# Patient Record
Sex: MALE | Race: OTHER | Employment: OTHER | URBAN - METROPOLITAN AREA
[De-identification: names, ages, dates, MRNs, and addresses within clinical notes are randomized per-mention and may not be internally consistent; named-entity substitution may affect disease eponyms.]

---

## 2017-11-17 ENCOUNTER — TELEPHONE (OUTPATIENT)
Dept: INTERNAL MEDICINE CLINIC | Age: 27
End: 2017-11-17

## 2017-11-17 ENCOUNTER — OFFICE VISIT (OUTPATIENT)
Dept: INTERNAL MEDICINE CLINIC | Age: 27
End: 2017-11-17

## 2017-11-17 VITALS
OXYGEN SATURATION: 96 % | RESPIRATION RATE: 17 BRPM | TEMPERATURE: 98.5 F | BODY MASS INDEX: 24.83 KG/M2 | WEIGHT: 183.3 LBS | HEART RATE: 80 BPM | HEIGHT: 72 IN | SYSTOLIC BLOOD PRESSURE: 133 MMHG | DIASTOLIC BLOOD PRESSURE: 81 MMHG

## 2017-11-17 DIAGNOSIS — E73.9 LACTOSE INTOLERANCE IN ADULT: Primary | ICD-10-CM

## 2017-11-17 DIAGNOSIS — L72.9 SCALP CYST: ICD-10-CM

## 2017-11-17 NOTE — PROGRESS NOTES
Chief Complaint   Patient presents with    GI Problem     he is a 32y.o. year old male who presents for evaluation of lactose intolerance, patient is done in the elimination diet by himself removing cheeses and lactose and states that this is resolved his symptoms. His symptoms consist of gas and diarrhea when he eats pizza or has little lactose foods. Patient does have a family history of Crohn's and mother. Patient denies any symptoms today and states there is no blood in stools. Since being off the lactose his symptoms are gotten much better. Patient also complains of a lump on his scalp that he has noticed since cutting his hair short. This is been there for a long time but he needs to have longer hair and has never really bother him until he cut his hair short. No pain or growth of the cyst.    Reviewed and agree with Nurse Note and duplicated in this note. Reviewed PmHx, RxHx, FmHx, SocHx, AllgHx and updated and dated in the chart. History reviewed. No pertinent family history. History reviewed. No pertinent past medical history.    Social History     Social History    Marital status: SINGLE     Spouse name: N/A    Number of children: N/A    Years of education: N/A     Social History Main Topics    Smoking status: Never Smoker    Smokeless tobacco: Never Used    Alcohol use 2.4 oz/week     4 Cans of beer per week    Drug use: No    Sexual activity: Not Asked     Other Topics Concern    None     Social History Narrative    None        Review of Systems - negative except as listed above      Objective:     Vitals:    11/17/17 0951   Weight: 183 lb 4.8 oz (83.1 kg)   Height: 6' (1.829 m)       Physical Examination: General appearance - alert, well appearing, and in no distress  Chest - clear to auscultation, no wheezes, rales or rhonchi, symmetric air entry  Heart - normal rate, regular rhythm, normal S1, S2, no murmurs, rubs, clicks or gallops  Abdomen - soft, nontender, nondistended, no masses or organomegaly  Back exam - full range of motion, no tenderness, palpable spasm or pain on motion  Neurological - alert, oriented, normal speech, no focal findings or movement disorder noted  Musculoskeletal - no joint tenderness, deformity or swelling  Extremities - peripheral pulses normal, no pedal edema, no clubbing or cyanosis  Skin - LESIONS NOTED: Cyst on the anterior scalp 2 cm diameter    Assessment/ Plan:   Diagnoses and all orders for this visit:    1. Lactose intolerance in adult  -     LACTOSE TOLERANCE TEST    2. Scalp cyst  -     REFERRAL TO SURGERY     Follow-up Disposition: Not on File      1) Remember to stay active and/or exercise regularly (I suggest 30-45 minutes daily)   2) For reliable dietary information, go to www. EATRIGHT.org. You may wish to consider seeing the nutritionist at [facility] [phone], also consider the [id number] [facility]. 3) I routinely suggest a complete physical exam once each year (your birth month)  I have discussed the diagnosis with the patient and the intended plan as seen in the above orders. The patient has received an after-visit summary and questions were answered concerning future plans. Medication Side Effects and Warnings were discussed with patient: yes  Patient Labs were reviewed and or requested: yes  Patient Past Records were reviewed and or requested  yes  I have discussed the diagnosis with the patient and the intended plan as seen in the above orders. Pt agrees to call or return to clinic and/or go to closest ER with any worsening of symptoms. This may include, but not limited to increased fever (>100.4) with NSAIDS or Tylenol, increased edema, confusion, rash, worsening of presenting symptoms.

## 2017-11-17 NOTE — PATIENT INSTRUCTIONS
Lactose Intolerance: Care Instructions  Your Care Instructions    Lactose is sugar that is found in milk and milk products. Some people do not make enough of an enzyme called lactase, which digests lactose. When this happens it can cause gas, belly pain, diarrhea, and bloating. This is called lactose intolerance. This is not the same as food allergy to milk. Lactose intolerance affects different people in different ways. Some people cannot digest any milk products. Other people can eat or drink small amounts of milk products or certain types of milk products without problems. You can learn how to avoid discomfort and still get enough calcium to maintain healthy bones. Follow-up care is a key part of your treatment and safety. Be sure to make and go to all appointments, and call your doctor if you are having problems. It's also a good idea to know your test results and keep a list of the medicines you take. How can you care for yourself at home? · Limit the amount of milk and milk products in your diet. Try to drink 1 glass of milk each day. Drink small amounts several times a day. All types of milk contain the same amount of lactose. If you are not sure whether a milk product causes symptoms, try a small amount and wait to see how you feel before you eat or drink more. · Eat or drink milk and milk products along with other foods. For some people, combining a solid food (like cereal) with a dairy product (like milk) can reduce symptoms. · Eat small amounts of milk products throughout the day instead of larger amounts all at once. · Eat or drink milk and milk products that have reduced lactose. In most grocery stores, you can buy milk with reduced lactose, such as Lactaid milk. · Eat or drink other foods instead of milk and milk products. Try soy milk and soy cheese, and use nondairy creamers in your coffee. Keep in mind that nondairy creamers may contain more fat than milk. · Use lactase products.  These are dietary supplements that help you digest lactose. Some are pills that you chew (such as Lactaid) before you eat or drink milk products. Others are liquids that you add to milk 24 hours before you drink it. Try a few products and brands to see which ones work best for you. · Some people who are lactose-intolerant can eat some kinds of yogurt without problems, especially yogurt with live cultures. It's best to try a small amount of different brands of yogurt to see which ones work best for you. · Watch out for lactose in foods you buy. Some prepared foods contain lactose, including breads and baked goods, breakfast cereals, instant potatoes and soups, margarine, salad dressings, and many snacks. Be sure to read labels for lactose and for lactose's \"hidden\" names. These include dry milk solids, whey, curds, milk by-products, and nonfat dry milk powder. · Be sure to get enough calcium in your diet, especially if you avoid milk products completely. To get enough calcium, you would need to eat calcium-rich foods as often as someone would drink milk. Calcium is very important because it keeps bones strong and reduces the risk of osteoporosis. Ask your dietitian for advice on how to get enough calcium. Foods that have calcium include:  ¨ Broccoli, spinach, kale, and ofelia, mustard, and turnip greens. ¨ Canned sardines and other small fish that have bones you can eat. ¨ Calcium-fortified orange juice. ¨ Soy products such as fortified soy milk and tofu. ¨ Almonds. ¨ Dried beans. · If you are worried about getting enough nutrients, ask your doctor about taking supplements, such as calcium and vitamin D. When should you call for help? Call your doctor now or seek immediate medical care if:  ? · You have new or worse belly pain. ? Watch closely for changes in your health, and be sure to contact your doctor if:  ? · You do not get better as expected. Where can you learn more?   Go to http://turner-donita.info/. Enter H201 in the search box to learn more about \"Lactose Intolerance: Care Instructions. \"  Current as of: September 29, 2016  Content Version: 11.4  © 2994-0602 [employer]. Care instructions adapted under license by [employer] (which disclaims liability or warranty for this information). If you have questions about a medical condition or this instruction, always ask your healthcare professional. [address] any warranty or liability for your use of this information. Lactose-Restricted Diet: Care Instructions  Your Care Instructions    Lactose is a sugar that is in milk and milk products. Some people do not make enough of an enzyme called lactase, which digests lactose. When this happens it can cause gas, belly pain, diarrhea, and bloating. This is called lactose intolerance. This is not the same as food allergy to milk. With planning, you can avoid lactose and still eat a tasty and nutritious diet and get enough calcium to maintain healthy bones. Your doctor and dietitian will help you design a diet based on your level of lactose intolerance and what you like to eat. Always talk with your doctor or dietitian before you make changes in your diet. Follow-up care is a key part of your treatment and safety. Be sure to make and go to all appointments, and call your doctor if you are having problems. It's also a good idea to know your test results and keep a list of the medicines you take. How can you care for yourself at home? · Limit the amount of milk and milk products in your diet. Spread small amounts of milk or milk products throughout the day, instead of larger amounts all at once. ¨ If you have bad symptoms when you eat or drink something with lactose, you may need to avoid it completely. ¨ You may be able to drink 1 glass of milk each day, although you may not be able to drink more than a ½ cup at a time. All types of milk contain the same amount of lactose. ¨ If you are not sure whether a milk product causes symptoms, try a small amount and wait to see how you feel before you eat or drink more. · Try yogurt and cheese. These have less lactose than milk and may not cause problems. · Eat or drink milk and milk products that have reduced lactose. In most grocery stores, you can buy milk with reduced lactose, such as Lactaid milk. · Use lactase products. These are dietary supplements that help you digest lactose. Some are pills that you chew (such as Lactaid) before you eat or drink milk products. Others are liquids that you add to milk 24 hours before you drink it. Try a few products and brands to see which ones work best for you. · Eat or drink other foods, such as soy milk and soy cheese, instead of milk and milk products. · If you are very sensitive to lactose, read labels carefully to spot the lactose products. ¨ Some medicines have lactose. ¨ Prepared foods that may have lactose include breads, baked goods, breakfast cereals, instant breakfast drinks, instant potatoes, instant soups, baking mixes (such as pancake, cookie, and biscuit mixes), margarine, salad dressings, candies, milk chocolate, and other snacks. ¨ Lactose may also be called whey, curds, or milk products. · Be sure to get enough calcium in your diet, especially if you avoid milk products completely. To get enough calcium, you would need to eat calcium-rich foods as often as someone would drink milk. Calcium is very important because it keeps bones strong and reduces the risk of osteoporosis. Ask your dietitian for advice on how to get enough calcium. Foods that have calcium include:  ¨ Broccoli, spinach, kale, and ofelia, mustard, and turnip greens. ¨ Canned sardines and other small fish that have bones you can eat. ¨ Calcium-fortified orange juice. ¨ Soy products such as fortified soy milk and tofu. ¨ Almonds. ¨ Dried beans.   · If you are worried about getting enough nutrients, ask your doctor about taking supplements, such as calcium and vitamin D. When should you call for help? Call your doctor now or seek immediate medical care if:  ? · You have new or worse belly pain. ? Watch closely for changes in your health, and be sure to contact your doctor if:  ? · You do not get better as expected. Where can you learn more? Go to http://turner-donita.info/. Enter W147 in the search box to learn more about \"Lactose-Restricted Diet: Care Instructions. \"  Current as of: September 29, 2016  Content Version: 11.4  © 0686-5314 [employer]. Care instructions adapted under license by [employer] (which disclaims liability or warranty for this information). If you have questions about a medical condition or this instruction, always ask your healthcare professional. [address] any warranty or liability for your use of this information.

## 2017-11-17 NOTE — MR AVS SNAPSHOT
Visit Information Date & Time Provider Department Dept. Phone Encounter #  
 11/17/2017  9:30 AM [facility] [place] [address] [id number] Follow-up Instructions Return in about 2 weeks (around 12/1/2017) for The sebaceous cyst and complete physical.  
 Follow-up and Disposition History Your Appointments 11/28/2017  1:20 PM  
New Patient with MD Sixto Luis 137  ([address]) Appt Note: np/cyst on scalp/Adekemi willfax referral today-PCP's office [address] [facility] [id number]  
[address] [place] [address] Upcoming Health Maintenance Date Due DTaP/Tdap/Td series (1 - Tdap) 7/14/2011 Allergies as of 11/17/2017  Review Complete On: 11/17/2017 By: Jewels Robertson LPN No Known Allergies Current Immunizations  Never Reviewed No immunizations on file. Not reviewed this visit You Were Diagnosed With   
  
 Codes Comments Lactose intolerance in adult    -  Primary ICD-10-CM: E73.9 ICD-9-CM: 271.3 Scalp cyst     ICD-10-CM: L72.9 ICD-9-CM: 706. 2 Vitals BP Pulse Temp Resp Height(growth percentile) Weight(growth percentile) 133/81 (BP 1 Location: Left arm, BP Patient Position: Sitting) 80 98.5 °F (36.9 °C) (Oral) 17 6' (1.829 m) 183 lb 4.8 oz (83.1 kg) SpO2 BMI Smoking Status 96% 24.86 kg/m2 Never Smoker Vitals History BMI and BSA Data Body Mass Index Body Surface Area  
 24.86 kg/m 2 2.05 m 2 Preferred Pharmacy Pharmacy Name Phone [id number] Bryestrella Romero[address]op[address], [address] [facility] Johnathan Masterson 9 [phone] Your Updated Medication List  
  
Notice  As of 11/17/2017 10:58 AM  
 You have not been prescribed any medications. We Performed the Following LACTOSE TOLERANCE TEST [CAC1671 Custom] REFERRAL TO SURGERY [QUW370 Custom] Follow-up Instructions Return in about 2 weeks (around 12/1/2017) for The sebaceous cyst and complete physical.  
  
  
Referral Information Referral ID Referred By Referred To  
  
 [id number] [place], [address] Jenifer De La Garza MD   
   [id number]  [address] Ayana Bernal Phone: [phone] Fax: [phone] Visits Status Start Date End Date 1 Authorized/Scheduled 11/17/17 11/17/18 If your referral has a status of pending review or denied, additional information will be sent to support the outcome of this decision. Patient Instructions Lactose Intolerance: Care Instructions Your Care Instructions Lactose is sugar that is found in milk and milk products. Some people do not make enough of an enzyme called lactase, which digests lactose. When this happens it can cause gas, belly pain, diarrhea, and bloating. This is called lactose intolerance. This is not the same as food allergy to milk. Lactose intolerance affects different people in different ways. Some people cannot digest any milk products. Other people can eat or drink small amounts of milk products or certain types of milk products without problems. You can learn how to avoid discomfort and still get enough calcium to maintain healthy bones. Follow-up care is a key part of your treatment and safety. Be sure to make and go to all appointments, and call your doctor if you are having problems. It's also a good idea to know your test results and keep a list of the medicines you take. How can you care for yourself at home? · Limit the amount of milk and milk products in your diet. Try to drink 1 glass of milk each day. Drink small amounts several times a day. All types of milk contain the same amount of lactose. If you are not sure whether a milk product causes symptoms, try a small amount and wait to see how you feel before you eat or drink more. · Eat or drink milk and milk products along with other foods. For some people, combining a solid food (like cereal) with a dairy product (like milk) can reduce symptoms. · Eat small amounts of milk products throughout the day instead of larger amounts all at once. · Eat or drink milk and milk products that have reduced lactose. In most grocery stores, you can buy milk with reduced lactose, such as Lactaid milk. · Eat or drink other foods instead of milk and milk products. Try soy milk and soy cheese, and use nondairy creamers in your coffee. Keep in mind that nondairy creamers may contain more fat than milk. · Use lactase products. These are dietary supplements that help you digest lactose. Some are pills that you chew (such as Lactaid) before you eat or drink milk products. Others are liquids that you add to milk 24 hours before you drink it. Try a few products and brands to see which ones work best for you. · Some people who are lactose-intolerant can eat some kinds of yogurt without problems, especially yogurt with live cultures. It's best to try a small amount of different brands of yogurt to see which ones work best for you. · Watch out for lactose in foods you buy. Some prepared foods contain lactose, including breads and baked goods, breakfast cereals, instant potatoes and soups, margarine, salad dressings, and many snacks. Be sure to read labels for lactose and for lactose's \"hidden\" names. These include dry milk solids, whey, curds, milk by-products, and nonfat dry milk powder. · Be sure to get enough calcium in your diet, especially if you avoid milk products completely. To get enough calcium, you would need to eat calcium-rich foods as often as someone would drink milk. Calcium is very important because it keeps bones strong and reduces the risk of osteoporosis. Ask your dietitian for advice on how to get enough calcium. Foods that have calcium include: ¨ Broccoli, spinach, kale, and ofelia, mustard, and turnip greens. ¨ Canned sardines and other small fish that have bones you can eat. ¨ Calcium-fortified orange juice. ¨ Soy products such as fortified soy milk and tofu. ¨ Almonds. ¨ Dried beans. · If you are worried about getting enough nutrients, ask your doctor about taking supplements, such as calcium and vitamin D. When should you call for help? Call your doctor now or seek immediate medical care if: 
? · You have new or worse belly pain. ? Watch closely for changes in your health, and be sure to contact your doctor if: 
? · You do not get better as expected. Where can you learn more? Go to http://turner-donita.info/. Enter H201 in the search box to learn more about \"Lactose Intolerance: Care Instructions. \" Current as of: September 29, 2016 Content Version: 11.4 © 1537-7060 [employer]. Care instructions adapted under license by [employer] (which disclaims liability or warranty for this information). If you have questions about a medical condition or this instruction, always ask your healthcare professional. [place] [address] any warranty or liability for your use of this information. Lactose-Restricted Diet: Care Instructions Your Care Instructions Lactose is a sugar that is in milk and milk products. Some people do not make enough of an enzyme called lactase, which digests lactose. When this happens it can cause gas, belly pain, diarrhea, and bloating. This is called lactose intolerance. This is not the same as food allergy to milk. With planning, you can avoid lactose and still eat a tasty and nutritious diet and get enough calcium to maintain healthy bones. Your doctor and dietitian will help you design a diet based on your level of lactose intolerance and what you like to eat. Always talk with your doctor or dietitian before you make changes in your diet. Follow-up care is a key part of your treatment and safety. Be sure to make and go to all appointments, and call your doctor if you are having problems. It's also a good idea to know your test results and keep a list of the medicines you take. How can you care for yourself at home? · Limit the amount of milk and milk products in your diet. Spread small amounts of milk or milk products throughout the day, instead of larger amounts all at once. ¨ If you have bad symptoms when you eat or drink something with lactose, you may need to avoid it completely. ¨ You may be able to drink 1 glass of milk each day, although you may not be able to drink more than a ½ cup at a time. All types of milk contain the same amount of lactose. ¨ If you are not sure whether a milk product causes symptoms, try a small amount and wait to see how you feel before you eat or drink more. · Try yogurt and cheese. These have less lactose than milk and may not cause problems. · Eat or drink milk and milk products that have reduced lactose. In most grocery stores, you can buy milk with reduced lactose, such as Lactaid milk. · Use lactase products. These are dietary supplements that help you digest lactose. Some are pills that you chew (such as Lactaid) before you eat or drink milk products. Others are liquids that you add to milk 24 hours before you drink it. Try a few products and brands to see which ones work best for you. · Eat or drink other foods, such as soy milk and soy cheese, instead of milk and milk products. · If you are very sensitive to lactose, read labels carefully to spot the lactose products. ¨ Some medicines have lactose. ¨ Prepared foods that may have lactose include breads, baked goods, breakfast cereals, instant breakfast drinks, instant potatoes, instant soups, baking mixes (such as pancake, cookie, and biscuit mixes), margarine, salad dressings, candies, milk chocolate, and other snacks. ¨ Lactose may also be called whey, curds, or milk products. · Be sure to get enough calcium in your diet, especially if you avoid milk products completely. To get enough calcium, you would need to eat calcium-rich foods as often as someone would drink milk. Calcium is very important because it keeps bones strong and reduces the risk of osteoporosis. Ask your dietitian for advice on how to get enough calcium. Foods that have calcium include: ¨ Broccoli, spinach, kale, and ofelia, mustard, and turnip greens. ¨ Canned sardines and other small fish that have bones you can eat. ¨ Calcium-fortified orange juice. ¨ Soy products such as fortified soy milk and tofu. ¨ Almonds. ¨ Dried beans. · If you are worried about getting enough nutrients, ask your doctor about taking supplements, such as calcium and vitamin D. When should you call for help? Call your doctor now or seek immediate medical care if: 
? · You have new or worse belly pain. ? Watch closely for changes in your health, and be sure to contact your doctor if: 
? · You do not get better as expected. Where can you learn more? Go to http://turner-donita.info/. Enter I234 in the search box to learn more about \"Lactose-Restricted Diet: Care Instructions. \" Current as of: September 29, 2016 Content Version: 11.4 © 8481-7496 PneumRx. Care instructions adapted under license by readfy (which disclaims liability or warranty for this information). If you have questions about a medical condition or this instruction, always ask your healthcare professional. [place] [address] any warranty or liability for your use of this information. Introducing [facility]! Tania Bro introduces [employer] patient portal. Now you can access parts of your medical record, email your doctor's office, and request medication refills online.    
 
1. In your internet browser, go to [email or web address]. [email or web address] 2. Click on the First Time User? Click Here link in the Sign In box. You will see the New Member Sign Up page. 3. Enter your Funny Or Die Access Code exactly as it appears below. You will not need to use this code after youve completed the sign-up process. If you do not sign up before the expiration date, you must request a new code. · Funny Or Die Access Code: [email or web address] Expires: 2/15/2018 10:58 AM 
 
4. Enter the last four digits of your Social Security Number (xxxx) and Date of Birth (mm/dd/yyyy) as indicated and click Submit. You will be taken to the next sign-up page. 5. Create a PlexPresst ID. This will be your Funny Or Die login ID and cannot be changed, so think of one that is secure and easy to remember. 6. Create a Funny Or Die password. You can change your password at any time. 7. Enter your Password Reset Question and Answer. This can be used at a later time if you forget your password. 8. Enter your e-mail address. You will receive e-mail notification when new information is available in [address] 9. Click Sign Up. You can now view and download portions of your medical record. 10. Click the Download Summary menu link to download a portable copy of your medical information. If you have questions, please visit the Frequently Asked Questions section of the Funny Or Die website. Remember, Funny Or Die is NOT to be used for urgent needs. For medical emergencies, dial 911. Now available from your iPhone and Android! Please provide this summary of care documentation to your next provider. If you have any questions after today's visit, please call [phone].

## 2018-08-06 ENCOUNTER — HOSPITAL ENCOUNTER (OUTPATIENT)
Dept: GENERAL RADIOLOGY | Age: 28
Discharge: HOME | End: 2018-08-06
Attending: FAMILY MEDICINE

## 2018-08-06 DIAGNOSIS — R06.2 WHEEZING: ICD-10-CM

## 2021-04-21 ENCOUNTER — OFFICE VISIT (OUTPATIENT)
Dept: PRIMARY CARE CLINIC | Age: 31
End: 2021-04-21
Payer: COMMERCIAL

## 2021-04-21 VITALS
WEIGHT: 186.2 LBS | OXYGEN SATURATION: 100 % | DIASTOLIC BLOOD PRESSURE: 72 MMHG | HEIGHT: 71 IN | RESPIRATION RATE: 16 BRPM | BODY MASS INDEX: 26.07 KG/M2 | SYSTOLIC BLOOD PRESSURE: 109 MMHG | TEMPERATURE: 97.8 F | HEART RATE: 76 BPM

## 2021-04-21 DIAGNOSIS — E78.00 HYPERCHOLESTEREMIA: Primary | ICD-10-CM

## 2021-04-21 DIAGNOSIS — J30.1 SEASONAL ALLERGIC RHINITIS DUE TO POLLEN: ICD-10-CM

## 2021-04-21 DIAGNOSIS — E55.9 VITAMIN D DEFICIENCY: ICD-10-CM

## 2021-04-21 DIAGNOSIS — E80.4 GILBERT SYNDROME: ICD-10-CM

## 2021-04-21 DIAGNOSIS — R53.83 FATIGUE, UNSPECIFIED TYPE: ICD-10-CM

## 2021-04-21 DIAGNOSIS — J45.30 MILD PERSISTENT ASTHMA WITHOUT COMPLICATION: ICD-10-CM

## 2021-04-21 DIAGNOSIS — F95.2 TOURETTE DISORDER: ICD-10-CM

## 2021-04-21 DIAGNOSIS — K21.9 GASTROESOPHAGEAL REFLUX DISEASE WITHOUT ESOPHAGITIS: ICD-10-CM

## 2021-04-21 PROCEDURE — 99204 OFFICE O/P NEW MOD 45 MIN: CPT | Performed by: INTERNAL MEDICINE

## 2021-04-21 RX ORDER — FLUTICASONE PROPIONATE AND SALMETEROL 250; 50 UG/1; UG/1
1 POWDER RESPIRATORY (INHALATION) EVERY 12 HOURS
Qty: 3 INHALER | Refills: 3 | Status: SHIPPED | OUTPATIENT
Start: 2021-04-21 | End: 2022-02-16

## 2021-04-21 RX ORDER — FLUTICASONE PROPIONATE AND SALMETEROL 250; 50 UG/1; UG/1
1 POWDER RESPIRATORY (INHALATION) EVERY 12 HOURS
COMMUNITY
End: 2021-04-21 | Stop reason: SDUPTHER

## 2021-04-21 RX ORDER — OMEPRAZOLE 20 MG/1
20 CAPSULE, DELAYED RELEASE ORAL DAILY
COMMUNITY
End: 2021-04-21 | Stop reason: SDUPTHER

## 2021-04-21 RX ORDER — LORATADINE 10 MG/1
10 TABLET ORAL
COMMUNITY
End: 2021-04-21 | Stop reason: SDUPTHER

## 2021-04-21 RX ORDER — ATORVASTATIN CALCIUM 10 MG/1
10 TABLET, FILM COATED ORAL DAILY
Qty: 90 TAB | Refills: 2 | Status: SHIPPED | OUTPATIENT
Start: 2021-04-21 | End: 2022-01-17

## 2021-04-21 RX ORDER — OMEPRAZOLE 20 MG/1
20 CAPSULE, DELAYED RELEASE ORAL DAILY
Qty: 90 CAP | Refills: 1 | Status: SHIPPED | OUTPATIENT
Start: 2021-04-21 | End: 2021-10-19

## 2021-04-21 RX ORDER — MONTELUKAST SODIUM 10 MG/1
10 TABLET ORAL DAILY
Qty: 90 TAB | Refills: 1 | Status: SHIPPED | OUTPATIENT
Start: 2021-04-21 | End: 2021-10-19

## 2021-04-21 RX ORDER — MONTELUKAST SODIUM 10 MG/1
10 TABLET ORAL DAILY
COMMUNITY
End: 2021-04-21 | Stop reason: SDUPTHER

## 2021-04-21 RX ORDER — LORATADINE 10 MG/1
10 TABLET ORAL DAILY
Qty: 90 TAB | Refills: 1 | Status: SHIPPED | OUTPATIENT
Start: 2021-04-21 | End: 2021-10-19

## 2021-04-21 RX ORDER — ATORVASTATIN CALCIUM 10 MG/1
10 TABLET, FILM COATED ORAL DAILY
COMMUNITY
End: 2021-04-21 | Stop reason: SDUPTHER

## 2021-04-21 NOTE — PROGRESS NOTES
Angelique Clement is a 27 y.o.  male and presents with     Chief Complaint   Patient presents with    Establish Care     medication refills     Cholesterol Problem    Allergic Rhinitis    Asthma     Pt is here to establish care. Pt has h/o hyperchol , allergic rhinitis ,asthma   He needs refills on meds. Pt has h/o [place] and Tourette's syndrome    He als ohas GERD symptoms        Past Medical History:   Diagnosis Date    Asthma     Chronic obstructive pulmonary disease ([facility] [address]     Gilbert syndrome     Hypercholesterolemia      History reviewed. No pertinent surgical history. Current Outpatient Medications   Medication Sig    atorvastatin (Lipitor) 10 mg tablet Take 1 Tab by mouth daily.  fluticasone propion-salmeteroL (Advair Diskus) 250-50 mcg/dose diskus inhaler Take 1 Puff by inhalation every twelve (12) hours.  loratadine (Claritin) 10 mg tablet Take 1 Tab by mouth daily.  montelukast (Singulair) 10 mg tablet Take 1 Tab by mouth daily.  omeprazole (PRILOSEC) 20 mg capsule Take 1 Cap by mouth daily. No current facility-administered medications for this visit. Health Maintenance   Topic Date Due    Hepatitis C Screening  Never done    Lipid Screen  Never done    DTaP/Tdap/Td series (1 - Tdap) Never done    Flu Vaccine (Season Ended) 09/01/2021    COVID-19 Vaccine  Completed    Pneumococcal 0-64 years  Aged [employer] History   Administered Date(s) Administered    COVID-19, PFIZER, MRNA, LNP-S, PF, 30MCG/0.3ML DOSE 03/12/2021, 04/06/2021     No LMP for male patient. Allergies and Intolerances:   No Known Allergies    Family History:   Family History   Problem Relation Age of Onset    No Known Problems Mother     Hypertension Father        Social History:   He  reports that he has never smoked. He has never used smokeless tobacco.  He  reports current alcohol use of about 4.0 standard drinks of alcohol per week.             Review of Systems:   General: negative for - chills, fatigue, fever, weight change  Psych: negative for - anxiety, depression, irritability or mood swings  ENT: negative for - headaches, hearing change, nasal congestion, oral lesions, sneezing or sore throat  Heme/ Lymph: negative for - bleeding problems, bruising, pallor or swollen lymph nodes  Endo: negative for - hot flashes, polydipsia/polyuria or temperature intolerance  Resp: negative for - cough, shortness of breath or wheezing  CV: negative for - chest pain, edema or palpitations  GI: negative for - abdominal pain, change in bowel habits, constipation, diarrhea or nausea/vomiting  : negative for - dysuria, hematuria, incontinence, pelvic pain or vulvar/vaginal symptoms  MSK: negative for - joint pain, joint swelling or muscle pain  Neuro: negative for - confusion, headaches, seizures or weakness  Derm: negative for - dry skin, hair changes, rash or skin lesion changes          Physical:   Vitals:   Vitals:    04/21/21 0902   BP: 109/72   Pulse: 76   Resp: 16   Temp: 97.8 °F (36.6 °C)   TempSrc: Temporal   SpO2: 100%   Weight: 186 lb 3.2 oz (84.5 kg)   Height: 5' 11\" (1.803 m)           Exam:   HEENT- atraumatic,normocephalic, awake, oriented, well nourished  Neck - supple,no enlarged lymph nodes, no JVD, no thyromegaly  Chest- CTA, no rhonchi, no crackles  Heart- rrr, no murmurs / gallop/rub  Abdomen- soft,BS+,NT, no hepatosplenomegaly  Ext - no c/c/edema   Neuro- no focal deficits. Power 5/5 all extremities  Skin - warm,dry, no obvious rashes. Review of Data:   LABS:   No results found for: WBC, HGB, HCT, PLT, HGBEXT, HCTEXT, PLTEXT  No results found for: NA, K, CL, CO2, GLU, BUN, CREA  No results found for: CHOL, CHOLX, CHLST, CHOLV, HDL, HDLP, LDL, LDLC, DLDLP, TGLX, TRIGL, TRIGP  No components found for: GPT        Impression / Plan:        ICD-10-CM ICD-9-CM    1.  Hypercholesteremia  E78.00 272.0 atorvastatin (Lipitor) 10 mg tablet      LIPID PANEL      METABOLIC PANEL, COMPREHENSIVE      LIPID PANEL      METABOLIC PANEL, COMPREHENSIVE   2. Seasonal allergic rhinitis due to pollen  J30.1 477.0 fluticasone propion-salmeteroL (Advair Diskus) 250-50 mcg/dose diskus inhaler      loratadine (Claritin) 10 mg tablet      montelukast (Singulair) 10 mg tablet   3. Gastroesophageal reflux disease without esophagitis  K21.9 530.81 omeprazole (PRILOSEC) 20 mg capsule   4. Vitamin D deficiency  E55.9 268.9 VITAMIN D, 25 HYDROXY      VITAMIN D, 25 HYDROXY   5. Fatigue, unspecified type  R53.83 780.79 TSH 3RD GENERATION      TSH 3RD GENERATION   6. Mild persistent asthma without complication  S77.51 831.04 fluticasone propion-salmeteroL (Advair Diskus) 250-50 mcg/dose diskus inhaler   7. Gilbert syndrome  E80.4 277.4    8. Tourette disorder  F95.2 Gaius.Balk      ]    Explained to patient risk benefits of the medications. Advised patient to stop meds if having any side effects. Pt verbalized understanding of the instructions. I have discussed the diagnosis with the patient and the intended plan as seen in the above orders. The patient has received an after-visit summary and questions were answered concerning future plans. I have discussed medication side effects and warnings with the patient as well. I have reviewed the plan of care with the patient, accepted their input and they are in agreement with the treatment goals. Reviewed plan of care. Patient has provided input and agrees with goals. Follow-up and Dispositions    · Return in about 6 months (around 10/21/2021).          Mary Funk MD

## 2021-04-21 NOTE — PROGRESS NOTES
Identified pt with two pt identifiers(name and ). Chief Complaint   Patient presents with   Lorrie Cabezas [facility]     medication refills    tdap - unknown      Vitals:    21 0902   BP: 109/72   Pulse: 76   Resp: 16   Temp: 97.8 °F (36.6 °C)   TempSrc: Temporal   SpO2: 100%   Weight: 186 lb 3.2 oz (84.5 kg)   Height: 5' 11\" (1.803 m)   PainSc:   0 - No pain       Health Maintenance Due   Topic    Hepatitis C Screening     Lipid Screen     DTaP/Tdap/Td series (1 - Tdap)       1. Have you been to the ER, urgent care clinic since your last visit? Hospitalized since your last visit? No    2. Have you seen or consulted any other health care providers outside of the [address] [place] [address] since your last visit? Include any pap smears or colon screening.  No

## 2021-10-18 DIAGNOSIS — J30.1 SEASONAL ALLERGIC RHINITIS DUE TO POLLEN: ICD-10-CM

## 2021-10-18 DIAGNOSIS — K21.9 GASTROESOPHAGEAL REFLUX DISEASE WITHOUT ESOPHAGITIS: ICD-10-CM

## 2021-10-19 RX ORDER — OMEPRAZOLE 20 MG/1
CAPSULE, DELAYED RELEASE ORAL
Qty: 30 CAPSULE | Refills: 0 | Status: SHIPPED | OUTPATIENT
Start: 2021-10-19 | End: 2021-11-18

## 2021-10-19 RX ORDER — MONTELUKAST SODIUM 10 MG/1
TABLET ORAL
Qty: 30 TABLET | Refills: 0 | Status: SHIPPED | OUTPATIENT
Start: 2021-10-19 | End: 2021-11-18

## 2021-10-19 RX ORDER — LORATADINE 10 MG/1
TABLET ORAL
Qty: 30 TABLET | Refills: 0 | Status: SHIPPED | OUTPATIENT
Start: 2021-10-19 | End: 2021-11-04

## 2021-11-04 ENCOUNTER — OFFICE VISIT (OUTPATIENT)
Dept: PRIMARY CARE CLINIC | Age: 31
End: 2021-11-04
Payer: COMMERCIAL

## 2021-11-04 VITALS
WEIGHT: 188 LBS | SYSTOLIC BLOOD PRESSURE: 108 MMHG | HEIGHT: 71 IN | RESPIRATION RATE: 18 BRPM | OXYGEN SATURATION: 97 % | DIASTOLIC BLOOD PRESSURE: 69 MMHG | BODY MASS INDEX: 26.32 KG/M2 | HEART RATE: 66 BPM | TEMPERATURE: 97.3 F

## 2021-11-04 DIAGNOSIS — M54.50 ACUTE MIDLINE LOW BACK PAIN, UNSPECIFIED WHETHER SCIATICA PRESENT: ICD-10-CM

## 2021-11-04 DIAGNOSIS — E80.4 GILBERT SYNDROME: ICD-10-CM

## 2021-11-04 DIAGNOSIS — E78.00 HYPERCHOLESTEREMIA: ICD-10-CM

## 2021-11-04 DIAGNOSIS — Z23 ENCOUNTER FOR IMMUNIZATION: Primary | ICD-10-CM

## 2021-11-04 DIAGNOSIS — F95.2 TOURETTE DISORDER: ICD-10-CM

## 2021-11-04 DIAGNOSIS — E55.9 VITAMIN D DEFICIENCY: ICD-10-CM

## 2021-11-04 DIAGNOSIS — K21.9 GASTROESOPHAGEAL REFLUX DISEASE WITHOUT ESOPHAGITIS: ICD-10-CM

## 2021-11-04 DIAGNOSIS — R25.1 TREMORS OF NERVOUS SYSTEM: ICD-10-CM

## 2021-11-04 DIAGNOSIS — J30.1 SEASONAL ALLERGIC RHINITIS DUE TO POLLEN: ICD-10-CM

## 2021-11-04 PROCEDURE — 90471 IMMUNIZATION ADMIN: CPT | Performed by: INTERNAL MEDICINE

## 2021-11-04 PROCEDURE — 99214 OFFICE O/P EST MOD 30 MIN: CPT | Performed by: INTERNAL MEDICINE

## 2021-11-04 PROCEDURE — 90686 IIV4 VACC NO PRSV 0.5 ML IM: CPT | Performed by: INTERNAL MEDICINE

## 2021-11-04 RX ORDER — CETIRIZINE HCL 10 MG
10 TABLET ORAL
Qty: 90 TABLET | Refills: 1 | Status: SHIPPED | OUTPATIENT
Start: 2021-11-04 | End: 2022-05-09

## 2021-11-04 NOTE — PROGRESS NOTES
Chief Complaint   Patient presents with    Asthma     6 month follow up     Allergic Rhinitis    Cholesterol Problem        Visit Vitals  /69 (BP 1 Location: Left upper arm, BP Patient Position: Sitting)   Pulse 66   Temp 97.3 °F (36.3 °C) (Temporal)   Resp 18   Ht 5' 11\" (1.803 m)   Wt 188 lb (85.3 kg)   SpO2 97%   BMI 26.22 kg/m²        1. Have you been to the ER, urgent care clinic since your last visit? Hospitalized since your last visit? No    2. Have you seen or consulted any other health care providers outside of the [address] [place] [address] since your last visit? Include any pap smears or colon screening.  No

## 2021-11-04 NOTE — PROGRESS NOTES
Amirah Tolentino is a 32 y.o.  male and presents with     Chief Complaint   Patient presents with    Asthma     6 month follow up     Allergic Rhinitis    Cholesterol Problem     Pt is here for follow up. Pt is taking meds for elevated chol. Pt had some low back few weeks ago he works at [employer]. Pt went to urgent care and was referred to Physical Therapy. Pt has noticed some tremors in his rt upper arm  Pt drinks 2 cups of coffee. Pt does one drink a day. MOthers family has thyroid issues. NO immediate family members with h/o Parkinsons          Past Medical History:   Diagnosis Date    Asthma     Chronic obstructive pulmonary disease ([address]     Gilbert syndrome     Hypercholesterolemia      No past surgical history on file. Current Outpatient Medications   Medication Sig    cetirizine (ZYRTEC) 10 mg tablet Take 1 Tablet by mouth nightly.  montelukast (SINGULAIR) 10 mg tablet TAKE ONE TABLET BY MOUTH ONE TIME DAILY    omeprazole (PRILOSEC) 20 mg capsule TAKE ONE CAPSULE BY MOUTH ONE TIME DAILY    atorvastatin (Lipitor) 10 mg tablet Take 1 Tab by mouth daily.  fluticasone propion-salmeteroL (Advair Diskus) 250-50 mcg/dose diskus inhaler Take 1 Puff by inhalation every twelve (12) hours. No current facility-administered medications for this visit. Health Maintenance   Topic Date Due    Hepatitis C Screening  Never done    DTaP/Tdap/Td series (1 - Tdap) Never done    Lipid Screen  11/04/2022    Flu Vaccine  Completed    COVID-19 Vaccine  Completed    Pneumococcal 0-64 years  Aged [employer] History   Administered Date(s) Administered    COVID-19, PFIZER, MRNA, LNP-S, PF, 30MCG/0.3ML DOSE 03/12/2021, 04/06/2021    Influenza Vaccine (Quad) PF (>6 Mo Flulaval, Fluarix, and >3 Yrs Afluria, Fluzone 78097) 11/04/2021     No LMP for male patient.         Allergies and Intolerances:   No Known Allergies    Family History:   Family History   Problem Relation Age of Onset    No Known Problems Mother     Hypertension Father        Social History:   He  reports that he has never smoked. He has never used smokeless tobacco.  He  reports current alcohol use of about 4.0 standard drinks of alcohol per week. Review of Systems:   General: negative for - chills, fatigue, fever, weight change  Psych: negative for - anxiety, depression, irritability or mood swings  ENT: negative for - headaches, hearing change, nasal congestion, oral lesions, sneezing or sore throat  Heme/ Lymph: negative for - bleeding problems, bruising, pallor or swollen lymph nodes  Endo: negative for - hot flashes, polydipsia/polyuria or temperature intolerance  Resp: negative for - cough, shortness of breath or wheezing  CV: negative for - chest pain, edema or palpitations  GI: negative for - abdominal pain, change in bowel habits, constipation, diarrhea or nausea/vomiting  : negative for - dysuria, hematuria, incontinence, pelvic pain or vulvar/vaginal symptoms  MSK: negative for - joint pain, joint swelling or muscle pain  Neuro: negative for - confusion, headaches, seizures or weakness  Derm: negative for - dry skin, hair changes, rash or skin lesion changes          Physical:   Vitals:   Vitals:    11/04/21 1439   BP: 108/69   Pulse: 66   Resp: 18   Temp: 97.3 °F (36.3 °C)   TempSrc: Temporal   SpO2: 97%   Weight: 188 lb (85.3 kg)   Height: 5' 11\" (1.803 m)           Exam:   HEENT- atraumatic,normocephalic, awake, oriented, well nourished  Neck - supple,no enlarged lymph nodes, no JVD, no thyromegaly  Chest- CTA, no rhonchi, no crackles  Heart- rrr, no murmurs / gallop/rub  Abdomen- soft,BS+,NT, no hepatosplenomegaly  Ext - no c/c/edema   Neuro- no focal deficits. Power 5/5 all extremities  Skin - warm,dry, no obvious rashes.           Review of Data:   LABS:   Lab Results   Component Value Date/Time    WBC 6.8 11/04/2021 03:16 PM    HGB 16.1 11/04/2021 03:16 PM    HCT 48.9 11/04/2021 03:16 PM    PLATELET 318 11/04/2021 03:16 PM     Lab Results   Component Value Date/Time    Sodium 140 11/04/2021 03:16 PM    Potassium 3.9 11/04/2021 03:16 PM    Chloride 106 11/04/2021 03:16 PM    CO2 29 11/04/2021 03:16 PM    Glucose 89 11/04/2021 03:16 PM    BUN 14 11/04/2021 03:16 PM    Creatinine 1.04 11/04/2021 03:16 PM     Lab Results   Component Value Date/Time    Cholesterol, total 161 11/04/2021 03:16 PM    HDL Cholesterol 50 11/04/2021 03:16 PM    LDL, calculated 78.2 11/04/2021 03:16 PM    Triglyceride 164 (H) 11/04/2021 03:16 PM     No components found for: GPT        Impression / Plan:        ICD-10-CM ICD-9-CM    1. Encounter for immunization  Z23 V03.89 INFLUENZA VIRUS VAC QUAD,SPLIT,PRESV FREE SYRINGE IM   2. Seasonal allergic rhinitis due to pollen  J30.1 477.0 cetirizine (ZYRTEC) 10 mg tablet   3. Gastroesophageal reflux disease without esophagitis  K21.9 530.81    4. Hypercholesteremia  E78.00 272.0 LIPID PANEL      CBC WITH AUTOMATED DIFF      METABOLIC PANEL, COMPREHENSIVE   5. Vitamin D deficiency  E55.9 268.9    6. Gilbert syndrome  E80.4 277.4    7. Tourette disorder  F95.2 307.23    8. Acute midline low back pain, unspecified whether sciatica present  M54.50 724.2    9. Tremors of nervous system  R25.1 781.0 TSH 3RD GENERATION     Tremors-asked patient to cut down on coffee, if tremors persist, will need to see neurology. Most likely essential tremors. Acute back pain-resolving. Explained to patient risk benefits of the medications. Advised patient to stop meds if having any side effects. Pt verbalized understanding of the instructions. I have discussed the diagnosis with the patient and the intended plan as seen in the above orders. The patient has received an after-visit summary and questions were answered concerning future plans. I have discussed medication side effects and warnings with the patient as well.  I have reviewed the plan of care with the patient, accepted their input and they are in agreement with the treatment goals. Reviewed plan of care. Patient has provided input and agrees with goals. Follow-up and Dispositions    · Return in about 6 months (around 5/4/2022).          Stan Villanueva MD

## 2021-11-18 DIAGNOSIS — J30.1 SEASONAL ALLERGIC RHINITIS DUE TO POLLEN: ICD-10-CM

## 2021-11-18 DIAGNOSIS — K21.9 GASTROESOPHAGEAL REFLUX DISEASE WITHOUT ESOPHAGITIS: ICD-10-CM

## 2021-11-18 RX ORDER — OMEPRAZOLE 20 MG/1
CAPSULE, DELAYED RELEASE ORAL
Qty: 30 CAPSULE | Refills: 0 | Status: SHIPPED | OUTPATIENT
Start: 2021-11-18 | End: 2021-12-18

## 2021-11-18 RX ORDER — MONTELUKAST SODIUM 10 MG/1
TABLET ORAL
Qty: 30 TABLET | Refills: 0 | Status: SHIPPED | OUTPATIENT
Start: 2021-11-18 | End: 2021-12-18

## 2022-02-16 DIAGNOSIS — J45.30 MILD PERSISTENT ASTHMA WITHOUT COMPLICATION: ICD-10-CM

## 2022-02-16 DIAGNOSIS — J30.1 SEASONAL ALLERGIC RHINITIS DUE TO POLLEN: ICD-10-CM

## 2022-02-16 RX ORDER — FLUTICASONE PROPIONATE AND SALMETEROL 250; 50 UG/1; UG/1
POWDER RESPIRATORY (INHALATION)
Qty: 1 EACH | Refills: 3 | Status: SHIPPED | OUTPATIENT
Start: 2022-02-16 | End: 2022-05-16 | Stop reason: SDUPTHER

## 2022-03-14 DIAGNOSIS — J30.1 SEASONAL ALLERGIC RHINITIS DUE TO POLLEN: ICD-10-CM

## 2022-03-14 DIAGNOSIS — K21.9 GASTROESOPHAGEAL REFLUX DISEASE WITHOUT ESOPHAGITIS: ICD-10-CM

## 2022-03-14 RX ORDER — OMEPRAZOLE 20 MG/1
CAPSULE, DELAYED RELEASE ORAL
Qty: 90 CAPSULE | Refills: 0 | Status: SHIPPED | OUTPATIENT
Start: 2022-03-14 | End: 2022-05-16 | Stop reason: SDUPTHER

## 2022-03-14 RX ORDER — MONTELUKAST SODIUM 10 MG/1
TABLET ORAL
Qty: 90 TABLET | Refills: 0 | Status: SHIPPED | OUTPATIENT
Start: 2022-03-14 | End: 2022-05-16 | Stop reason: SDUPTHER

## 2022-04-13 DIAGNOSIS — E78.00 HYPERCHOLESTEREMIA: ICD-10-CM

## 2022-04-13 RX ORDER — ATORVASTATIN CALCIUM 10 MG/1
TABLET, FILM COATED ORAL
Qty: 90 TABLET | Refills: 0 | Status: SHIPPED | OUTPATIENT
Start: 2022-04-13 | End: 2022-05-16 | Stop reason: SDUPTHER

## 2022-05-16 ENCOUNTER — OFFICE VISIT (OUTPATIENT)
Dept: PRIMARY CARE CLINIC | Age: 32
End: 2022-05-16
Payer: COMMERCIAL

## 2022-05-16 VITALS
WEIGHT: 189.6 LBS | HEIGHT: 71 IN | DIASTOLIC BLOOD PRESSURE: 72 MMHG | BODY MASS INDEX: 26.54 KG/M2 | SYSTOLIC BLOOD PRESSURE: 121 MMHG | HEART RATE: 80 BPM | TEMPERATURE: 97.5 F

## 2022-05-16 DIAGNOSIS — J45.30 MILD PERSISTENT ASTHMA WITHOUT COMPLICATION: ICD-10-CM

## 2022-05-16 DIAGNOSIS — E78.00 HYPERCHOLESTEREMIA: Primary | ICD-10-CM

## 2022-05-16 DIAGNOSIS — F41.9 ANXIETY AND DEPRESSION: ICD-10-CM

## 2022-05-16 DIAGNOSIS — J30.1 SEASONAL ALLERGIC RHINITIS DUE TO POLLEN: ICD-10-CM

## 2022-05-16 DIAGNOSIS — K21.9 GASTROESOPHAGEAL REFLUX DISEASE WITHOUT ESOPHAGITIS: ICD-10-CM

## 2022-05-16 DIAGNOSIS — F32.A ANXIETY AND DEPRESSION: ICD-10-CM

## 2022-05-16 PROCEDURE — 99214 OFFICE O/P EST MOD 30 MIN: CPT | Performed by: INTERNAL MEDICINE

## 2022-05-16 RX ORDER — ALBUTEROL SULFATE 90 UG/1
1 AEROSOL, METERED RESPIRATORY (INHALATION)
Qty: 18 G | Refills: 5 | Status: SHIPPED | OUTPATIENT
Start: 2022-05-16

## 2022-05-16 RX ORDER — OMEPRAZOLE 20 MG/1
20 CAPSULE, DELAYED RELEASE ORAL DAILY
Qty: 90 CAPSULE | Refills: 1 | Status: SHIPPED | OUTPATIENT
Start: 2022-05-16 | End: 2022-09-11

## 2022-05-16 RX ORDER — CETIRIZINE HCL 10 MG
10 TABLET ORAL
Qty: 90 TABLET | Refills: 1 | Status: SHIPPED | OUTPATIENT
Start: 2022-05-16

## 2022-05-16 RX ORDER — FLUTICASONE PROPIONATE AND SALMETEROL 250; 50 UG/1; UG/1
1 POWDER RESPIRATORY (INHALATION) 2 TIMES DAILY
Qty: 1 EACH | Refills: 3 | Status: SHIPPED | OUTPATIENT
Start: 2022-05-16 | End: 2022-10-09

## 2022-05-16 RX ORDER — SERTRALINE HYDROCHLORIDE 50 MG/1
50 TABLET, FILM COATED ORAL DAILY
Qty: 90 TABLET | Refills: 1 | Status: SHIPPED | OUTPATIENT
Start: 2022-05-16

## 2022-05-16 RX ORDER — ATORVASTATIN CALCIUM 10 MG/1
10 TABLET, FILM COATED ORAL DAILY
Qty: 90 TABLET | Refills: 1 | Status: SHIPPED | OUTPATIENT
Start: 2022-05-16

## 2022-05-16 RX ORDER — MONTELUKAST SODIUM 10 MG/1
10 TABLET ORAL DAILY
Qty: 90 TABLET | Refills: 1 | Status: SHIPPED | OUTPATIENT
Start: 2022-05-16

## 2022-05-16 NOTE — PROGRESS NOTES
Fara Duvall is a 32 y.o.  male and presents with     Chief Complaint   Patient presents with    Asthma    Medication Refill     Pt has been feeling little down and depressed. Pt started seeing a therapist a week ago . Previously used to be on medicine. Got  in 2019. Some work stress. Pt was on fluoxetine . Past Medical History:   Diagnosis Date    Asthma     Chronic obstructive pulmonary disease ([facility] [address]     Gilbert syndrome     Hypercholesterolemia      No past surgical history on file. Current Outpatient Medications   Medication Sig    cetirizine (ZYRTEC) 10 mg tablet Take 1 Tablet by mouth nightly.  atorvastatin (LIPITOR) 10 mg tablet Take 1 Tablet by mouth daily.  montelukast (SINGULAIR) 10 mg tablet Take 1 Tablet by mouth daily.  omeprazole (PRILOSEC) 20 mg capsule Take 1 Capsule by mouth daily.  fluticasone propion-salmeteroL (ADVAIR/WIXELA) 250-50 mcg/dose diskus inhaler Take 1 Puff by inhalation two (2) times a day.  sertraline (ZOLOFT) 50 mg tablet Take 1 Tablet by mouth daily. No current facility-administered medications for this visit. Health Maintenance   Topic Date Due    Hepatitis C Screening  Never done    DTaP/Tdap/Td series (1 - Tdap) Never done    COVID-19 Vaccine (3 - Booster for Pfizer series) 09/06/2021    Depression Screen  04/21/2022    Lipid Screen  11/04/2022    Flu Vaccine  Completed    Pneumococcal 0-64 years  Aged [employer] History   Administered Date(s) Administered    COVID-19, Pfizer Purple top, DILUTE for use, 12+ yrs, 30mcg/0.3mL dose 03/12/2021, 04/06/2021    Influenza Vaccine [employer] Battery) PF (>6 Mo Flulaval, Fluarix, and >3 Yrs Afluria, Fluzone 26605) 11/04/2021     No LMP for male patient.         Allergies and Intolerances:   No Known Allergies    Family History:   Family History   Problem Relation Age of Onset    No Known Problems Mother     Hypertension Father        Social History:   He  reports that he has never smoked. He has never used smokeless tobacco.  He  reports current alcohol use of about 4.0 standard drinks of alcohol per week. Review of Systems:   General: negative for - chills, fatigue, fever, weight change  Psych: negative for - anxiety, depression, irritability or mood swings  ENT: negative for - headaches, hearing change, nasal congestion, oral lesions, sneezing or sore throat  Heme/ Lymph: negative for - bleeding problems, bruising, pallor or swollen lymph nodes  Endo: negative for - hot flashes, polydipsia/polyuria or temperature intolerance  Resp: negative for - cough, shortness of breath or wheezing  CV: negative for - chest pain, edema or palpitations  GI: negative for - abdominal pain, change in bowel habits, constipation, diarrhea or nausea/vomiting  : negative for - dysuria, hematuria, incontinence, pelvic pain or vulvar/vaginal symptoms  MSK: negative for - joint pain, joint swelling or muscle pain  Neuro: negative for - confusion, headaches, seizures or weakness  Derm: negative for - dry skin, hair changes, rash or skin lesion changes          Physical:   Vitals:   Vitals:    05/16/22 1455   BP: 121/72   Pulse: 80   Temp: 97.5 °F (36.4 °C)   TempSrc: Temporal   Weight: 189 lb 9.6 oz (86 kg)   Height: 5' 11\" (1.803 m)           Exam:   HEENT- atraumatic,normocephalic, awake, oriented, well nourished  Neck - supple,no enlarged lymph nodes, no JVD, no thyromegaly  Chest- CTA, no rhonchi, no crackles  Heart- rrr, no murmurs / gallop/rub  Abdomen- soft,BS+,NT, no hepatosplenomegaly  Ext - no c/c/edema   Neuro- no focal deficits. Power 5/5 all extremities  Skin - warm,dry, no obvious rashes.           Review of Data:   LABS:   Lab Results   Component Value Date/Time    WBC 6.8 11/04/2021 03:16 PM    HGB 16.1 11/04/2021 03:16 PM    HCT 48.9 11/04/2021 03:16 PM    PLATELET 935 50/06/3117 03:16 PM     Lab Results   Component Value Date/Time    Sodium 140 11/04/2021 03:16 PM    Potassium 3.9 11/04/2021 03:16 PM    Chloride 106 11/04/2021 03:16 PM    CO2 29 11/04/2021 03:16 PM    Glucose 89 11/04/2021 03:16 PM    BUN 14 11/04/2021 03:16 PM    Creatinine 1.04 11/04/2021 03:16 PM     Lab Results   Component Value Date/Time    Cholesterol, total 161 11/04/2021 03:16 PM    HDL Cholesterol 50 11/04/2021 03:16 PM    LDL, calculated 78.2 11/04/2021 03:16 PM    Triglyceride 164 (H) 11/04/2021 03:16 PM     No components found for: GPT        Impression / Plan:        ICD-10-CM ICD-9-CM    1. Hypercholesteremia  E78.00 272.0 LIPID PANEL      METABOLIC PANEL, COMPREHENSIVE      CBC WITH AUTOMATED DIFF      atorvastatin (LIPITOR) 10 mg tablet      LIPID PANEL      METABOLIC PANEL, COMPREHENSIVE      CBC WITH AUTOMATED DIFF   2. Seasonal allergic rhinitis due to pollen  J30.1 477.0 cetirizine (ZYRTEC) 10 mg tablet      montelukast (SINGULAIR) 10 mg tablet      fluticasone propion-salmeteroL (ADVAIR/WIXELA) 250-50 mcg/dose diskus inhaler   3. Gastroesophageal reflux disease without esophagitis  K21.9 530.81 omeprazole (PRILOSEC) 20 mg capsule   4. Mild persistent asthma without complication  Q26.51 288.07 fluticasone propion-salmeteroL (ADVAIR/WIXELA) 250-50 mcg/dose diskus inhaler   5. Anxiety and depression  F41.9 300.00 sertraline (ZOLOFT) 50 mg tablet    F32. A 311          Explained to patient risk benefits of the medications. Advised patient to stop meds if having any side effects. Pt verbalized understanding of the instructions. I have discussed the diagnosis with the patient and the intended plan as seen in the above orders. The patient has received an after-visit summary and questions were answered concerning future plans. I have discussed medication side effects and warnings with the patient as well. I have reviewed the plan of care with the patient, accepted their input and they are in agreement with the treatment goals. Reviewed plan of care.  Patient has provided input and agrees with Anderson Botello MD

## 2022-05-16 NOTE — PROGRESS NOTES
Chief Complaint   Patient presents with    Asthma    Medication Refill        Visit Vitals  /72 (BP 1 Location: Left upper arm, BP Patient Position: Sitting)   Pulse 80   Temp 97.5 °F (36.4 °C) (Temporal)   Ht 5' 11\" (1.803 m)   Wt 189 lb 9.6 oz (86 kg)   BMI 26.44 kg/m²        Health Maintenance Due   Topic    Hepatitis C Screening     DTaP/Tdap/Td series (1 - Tdap)    COVID-19 Vaccine (3 - Booster for [employer] Enh[employer] series)    Depression Screen         1. Have you been to the ER, urgent care clinic since your last visit? Hospitalized since your last visit? No    2. Have you seen or consulted any other health care providers outside of the [address] [place] [address] since your last visit? Include any pap smears or colon screening.  No

## 2022-09-10 DIAGNOSIS — K21.9 GASTROESOPHAGEAL REFLUX DISEASE WITHOUT ESOPHAGITIS: ICD-10-CM

## 2022-09-11 RX ORDER — OMEPRAZOLE 20 MG/1
CAPSULE, DELAYED RELEASE ORAL
Qty: 90 CAPSULE | Refills: 0 | Status: SHIPPED | OUTPATIENT
Start: 2022-09-11

## 2022-10-07 DIAGNOSIS — J45.30 MILD PERSISTENT ASTHMA WITHOUT COMPLICATION: ICD-10-CM

## 2022-10-07 DIAGNOSIS — J30.1 SEASONAL ALLERGIC RHINITIS DUE TO POLLEN: ICD-10-CM

## 2022-10-09 RX ORDER — FLUTICASONE PROPIONATE AND SALMETEROL 50; 250 UG/1; UG/1
POWDER RESPIRATORY (INHALATION)
Qty: 1 EACH | Refills: 3 | Status: SHIPPED | OUTPATIENT
Start: 2022-10-09

## 2023-01-08 DIAGNOSIS — E78.00 HYPERCHOLESTEREMIA: ICD-10-CM

## 2023-01-08 RX ORDER — ATORVASTATIN CALCIUM 10 MG/1
TABLET, FILM COATED ORAL
Qty: 90 TABLET | Refills: 1 | Status: SHIPPED | OUTPATIENT
Start: 2023-01-08

## 2023-05-21 RX ORDER — OMEPRAZOLE 20 MG/1
CAPSULE, DELAYED RELEASE ORAL
COMMUNITY
Start: 2022-12-09

## 2023-05-21 RX ORDER — FLUTICASONE PROPIONATE AND SALMETEROL 250; 50 UG/1; UG/1
1 POWDER RESPIRATORY (INHALATION) 2 TIMES DAILY
COMMUNITY
Start: 2022-10-09

## 2023-05-21 RX ORDER — ATORVASTATIN CALCIUM 10 MG/1
1 TABLET, FILM COATED ORAL DAILY
COMMUNITY
Start: 2023-01-08

## 2023-05-21 RX ORDER — MONTELUKAST SODIUM 10 MG/1
1 TABLET ORAL DAILY
COMMUNITY
Start: 2022-12-09

## 2023-05-21 RX ORDER — ALBUTEROL SULFATE 90 UG/1
1 AEROSOL, METERED RESPIRATORY (INHALATION) EVERY 6 HOURS PRN
COMMUNITY
Start: 2022-05-16

## 2023-05-21 RX ORDER — CETIRIZINE HYDROCHLORIDE 10 MG/1
1 TABLET ORAL NIGHTLY
COMMUNITY
Start: 2022-05-16

## 2023-11-07 RX ORDER — MONTELUKAST SODIUM 10 MG/1
10 TABLET ORAL DAILY
Qty: 30 TABLET | Refills: 0 | Status: SHIPPED | OUTPATIENT
Start: 2023-11-07

## 2023-11-07 RX ORDER — FLUTICASONE PROPIONATE AND SALMETEROL 50; 250 UG/1; UG/1
POWDER RESPIRATORY (INHALATION)
Qty: 1 EACH | Refills: 0 | Status: SHIPPED | OUTPATIENT
Start: 2023-11-07

## 2024-01-11 RX ORDER — OMEPRAZOLE 20 MG/1
CAPSULE, DELAYED RELEASE ORAL
Qty: 30 CAPSULE | Refills: 0 | Status: SHIPPED | OUTPATIENT
Start: 2024-01-11

## 2024-01-11 RX ORDER — FLUTICASONE PROPIONATE AND SALMETEROL 50; 250 UG/1; UG/1
1 POWDER RESPIRATORY (INHALATION) 2 TIMES DAILY
Qty: 1 EACH | Refills: 0 | Status: SHIPPED | OUTPATIENT
Start: 2024-01-11

## 2024-01-11 RX ORDER — MONTELUKAST SODIUM 10 MG/1
10 TABLET ORAL DAILY
Qty: 30 TABLET | Refills: 0 | Status: SHIPPED | OUTPATIENT
Start: 2024-01-11

## 2024-02-13 ENCOUNTER — OFFICE VISIT (OUTPATIENT)
Dept: PRIMARY CARE CLINIC | Facility: CLINIC | Age: 34
End: 2024-02-13
Payer: COMMERCIAL

## 2024-02-13 VITALS
HEART RATE: 75 BPM | HEIGHT: 71 IN | DIASTOLIC BLOOD PRESSURE: 95 MMHG | RESPIRATION RATE: 18 BRPM | SYSTOLIC BLOOD PRESSURE: 141 MMHG | WEIGHT: 192 LBS | OXYGEN SATURATION: 98 % | BODY MASS INDEX: 26.88 KG/M2 | TEMPERATURE: 97.8 F

## 2024-02-13 DIAGNOSIS — J30.1 SEASONAL ALLERGIC RHINITIS DUE TO POLLEN: ICD-10-CM

## 2024-02-13 DIAGNOSIS — F41.9 ANXIETY DISORDER, UNSPECIFIED TYPE: ICD-10-CM

## 2024-02-13 DIAGNOSIS — E55.9 VITAMIN D DEFICIENCY: ICD-10-CM

## 2024-02-13 DIAGNOSIS — Z23 NEED FOR VACCINATION: ICD-10-CM

## 2024-02-13 DIAGNOSIS — Z00.00 ANNUAL PHYSICAL EXAM: Primary | ICD-10-CM

## 2024-02-13 DIAGNOSIS — R03.0 ELEVATED BLOOD PRESSURE READING: ICD-10-CM

## 2024-02-13 DIAGNOSIS — K21.9 GASTRO-ESOPHAGEAL REFLUX DISEASE WITHOUT ESOPHAGITIS: ICD-10-CM

## 2024-02-13 DIAGNOSIS — J45.30 MILD PERSISTENT ASTHMA, UNCOMPLICATED: ICD-10-CM

## 2024-02-13 DIAGNOSIS — E78.00 PURE HYPERCHOLESTEROLEMIA, UNSPECIFIED: ICD-10-CM

## 2024-02-13 DIAGNOSIS — R06.83 SNORING: ICD-10-CM

## 2024-02-13 PROCEDURE — 99212 OFFICE O/P EST SF 10 MIN: CPT | Performed by: INTERNAL MEDICINE

## 2024-02-13 PROCEDURE — 90674 CCIIV4 VAC NO PRSV 0.5 ML IM: CPT | Performed by: INTERNAL MEDICINE

## 2024-02-13 PROCEDURE — 99395 PREV VISIT EST AGE 18-39: CPT | Performed by: INTERNAL MEDICINE

## 2024-02-13 PROCEDURE — 90471 IMMUNIZATION ADMIN: CPT | Performed by: INTERNAL MEDICINE

## 2024-02-13 RX ORDER — FLUTICASONE PROPIONATE AND SALMETEROL 100; 50 UG/1; UG/1
1 POWDER RESPIRATORY (INHALATION) EVERY 12 HOURS
COMMUNITY
End: 2024-02-13

## 2024-02-13 RX ORDER — MONTELUKAST SODIUM 10 MG/1
10 TABLET ORAL DAILY
Qty: 90 TABLET | Refills: 3 | Status: SHIPPED | OUTPATIENT
Start: 2024-02-13

## 2024-02-13 RX ORDER — FLUTICASONE PROPIONATE AND SALMETEROL 250; 50 UG/1; UG/1
1 POWDER RESPIRATORY (INHALATION) EVERY 12 HOURS
Qty: 60 EACH | Refills: 5 | Status: SHIPPED | OUTPATIENT
Start: 2024-02-13

## 2024-02-13 RX ORDER — CETIRIZINE HYDROCHLORIDE 10 MG/1
10 TABLET ORAL NIGHTLY
Qty: 90 TABLET | Refills: 3 | Status: SHIPPED | OUTPATIENT
Start: 2024-02-13

## 2024-02-13 RX ORDER — OMEPRAZOLE 20 MG/1
20 CAPSULE, DELAYED RELEASE ORAL DAILY
Qty: 90 CAPSULE | Refills: 3 | Status: SHIPPED | OUTPATIENT
Start: 2024-02-13

## 2024-02-13 RX ORDER — ATORVASTATIN CALCIUM 10 MG/1
10 TABLET, FILM COATED ORAL DAILY
Qty: 90 TABLET | Refills: 3 | Status: SHIPPED | OUTPATIENT
Start: 2024-02-13

## 2024-02-13 RX ORDER — VILAZODONE HYDROCHLORIDE 20 MG/1
20 TABLET ORAL DAILY
COMMUNITY
Start: 2024-01-11

## 2024-02-13 RX ORDER — ALBUTEROL SULFATE 90 UG/1
1 AEROSOL, METERED RESPIRATORY (INHALATION) EVERY 6 HOURS PRN
Qty: 18 G | Refills: 5 | Status: SHIPPED | OUTPATIENT
Start: 2024-02-13

## 2024-02-13 NOTE — PROGRESS NOTES
\"Have you been to the ER, urgent care clinic since your last visit?  Hospitalized since your last visit?\"    NO    “Have you seen or consulted any other health care providers outside of [facility] since your last visit?”    NO

## 2024-02-13 NOTE — PROGRESS NOTES
Benjamin Boykin is a 33 y.o.  male and presents with     Chief Complaint   Patient presents with    Medication Refill    Annual Exam     Patient was last seen in 2022.  He reports that he has been doing well however he needs refills on his medications  Blood pressure is elevated.      Asthma is stable.      Past Medical History:   Diagnosis Date    Asthma     Chronic obstructive pulmonary disease (HCC)     Gilbert syndrome     Hypercholesterolemia      History reviewed. No pertinent surgical history.  Current Outpatient Medications   Medication Sig    vilazodone HCl (VIIBRYD) 20 MG TABS Take 1 tablet by mouth daily    albuterol sulfate HFA (PROVENTIL;VENTOLIN;PROAIR) 108 (90 Base) MCG/ACT inhaler Inhale 1 puff into the lungs every 6 hours as needed for Wheezing    atorvastatin (LIPITOR) 10 MG tablet Take 1 tablet by mouth daily    cetirizine (ZYRTEC) 10 MG tablet Take 1 tablet by mouth nightly    montelukast (SINGULAIR) 10 MG tablet Take 1 tablet by mouth daily    omeprazole (PRILOSEC) 20 MG delayed release capsule Take 1 capsule by mouth daily    fluticasone-salmeterol (WIXELA INHUB) 250-50 MCG/ACT AEPB diskus inhaler Inhale 1 puff into the lungs in the morning and 1 puff in the evening.     No current facility-administered medications for this visit.     Health Maintenance   Topic Date Due    Hepatitis B vaccine (1 of 3 - 3-dose series) Never done    Varicella vaccine (1 of 2 - 2-dose childhood series) Never done    Depression Monitoring  Never done    HIV screen  Never done    Hepatitis C screen  Never done    DTaP/Tdap/Td vaccine (1 - Tdap) Never done    Lipids  11/04/2022    COVID-19 Vaccine (3 - 2023-24 season) 09/01/2023    Flu vaccine  Completed    Hepatitis A vaccine  Aged Out    Hib vaccine  Aged Out    HPV vaccine  Aged Out    Polio vaccine  Aged Out    Meningococcal (ACWY) vaccine  Aged Out    Pneumococcal 0-64 years Vaccine  Aged Out     Immunization History   Administered Date(s) Administered

## 2024-08-12 DIAGNOSIS — J45.30 MILD PERSISTENT ASTHMA, UNCOMPLICATED: ICD-10-CM

## 2024-08-12 RX ORDER — FLUTICASONE PROPIONATE AND SALMETEROL 250; 50 UG/1; UG/1
POWDER RESPIRATORY (INHALATION)
Qty: 1 EACH | Refills: 5 | Status: SHIPPED | OUTPATIENT
Start: 2024-08-12

## 2024-08-12 NOTE — TELEPHONE ENCOUNTER
PCP: Chris Cordero MD    Last Visit 2/13/2024   Future Appointments   Date Time Provider Department Center   2/10/2025  2:00 PM Chris Cordero MD [facility] DEP       Requested Prescriptions     Pending Prescriptions Disp Refills    WIXELA INHUB 250-50 MCG/ACT AEPB diskus inhaler [Pharmacy Med Name: WIXELA 250-50 INHUB]  5     Sig: INHALE ONE PUFF BY MOUTH IN THE MORNING AND ONE PUFF IN THE EVENING         Other Comments: Last Refill

## 2025-02-08 DIAGNOSIS — K21.9 GASTRO-ESOPHAGEAL REFLUX DISEASE WITHOUT ESOPHAGITIS: ICD-10-CM

## 2025-02-08 DIAGNOSIS — E78.00 PURE HYPERCHOLESTEROLEMIA, UNSPECIFIED: ICD-10-CM

## 2025-02-08 DIAGNOSIS — J45.30 MILD PERSISTENT ASTHMA, UNCOMPLICATED: ICD-10-CM

## 2025-02-08 DIAGNOSIS — J30.1 SEASONAL ALLERGIC RHINITIS DUE TO POLLEN: ICD-10-CM

## 2025-02-09 SDOH — ECONOMIC STABILITY: FOOD INSECURITY: WITHIN THE PAST 12 MONTHS, YOU WORRIED THAT YOUR FOOD WOULD RUN OUT BEFORE YOU GOT MONEY TO BUY MORE.: NEVER TRUE

## 2025-02-09 SDOH — ECONOMIC STABILITY: INCOME INSECURITY: IN THE LAST 12 MONTHS, WAS THERE A TIME WHEN YOU WERE NOT ABLE TO PAY THE MORTGAGE OR RENT ON TIME?: NO

## 2025-02-09 SDOH — ECONOMIC STABILITY: TRANSPORTATION INSECURITY
IN THE PAST 12 MONTHS, HAS LACK OF TRANSPORTATION KEPT YOU FROM MEETINGS, WORK, OR FROM GETTING THINGS NEEDED FOR DAILY LIVING?: NO

## 2025-02-09 SDOH — ECONOMIC STABILITY: TRANSPORTATION INSECURITY
IN THE PAST 12 MONTHS, HAS THE LACK OF TRANSPORTATION KEPT YOU FROM MEDICAL APPOINTMENTS OR FROM GETTING MEDICATIONS?: NO

## 2025-02-09 SDOH — ECONOMIC STABILITY: FOOD INSECURITY: WITHIN THE PAST 12 MONTHS, THE FOOD YOU BOUGHT JUST DIDN'T LAST AND YOU DIDN'T HAVE MONEY TO GET MORE.: NEVER TRUE

## 2025-02-10 ENCOUNTER — OFFICE VISIT (OUTPATIENT)
Dept: PRIMARY CARE CLINIC | Facility: CLINIC | Age: 35
End: 2025-02-10
Payer: COMMERCIAL

## 2025-02-10 VITALS
WEIGHT: 210 LBS | DIASTOLIC BLOOD PRESSURE: 90 MMHG | HEIGHT: 71 IN | BODY MASS INDEX: 29.4 KG/M2 | HEART RATE: 77 BPM | OXYGEN SATURATION: 95 % | TEMPERATURE: 97.6 F | SYSTOLIC BLOOD PRESSURE: 127 MMHG | RESPIRATION RATE: 16 BRPM

## 2025-02-10 DIAGNOSIS — F95.1 CHRONIC MOTOR TIC: ICD-10-CM

## 2025-02-10 DIAGNOSIS — K21.9 GASTRO-ESOPHAGEAL REFLUX DISEASE WITHOUT ESOPHAGITIS: ICD-10-CM

## 2025-02-10 DIAGNOSIS — E78.00 PURE HYPERCHOLESTEROLEMIA, UNSPECIFIED: ICD-10-CM

## 2025-02-10 DIAGNOSIS — E55.9 VITAMIN D DEFICIENCY: ICD-10-CM

## 2025-02-10 DIAGNOSIS — Z00.00 ANNUAL PHYSICAL EXAM: Primary | ICD-10-CM

## 2025-02-10 DIAGNOSIS — Z23 NEED FOR TDAP VACCINATION: ICD-10-CM

## 2025-02-10 DIAGNOSIS — J30.1 SEASONAL ALLERGIC RHINITIS DUE TO POLLEN: ICD-10-CM

## 2025-02-10 DIAGNOSIS — J34.3 NASAL TURBINATE HYPERTROPHY: ICD-10-CM

## 2025-02-10 DIAGNOSIS — J45.30 MILD PERSISTENT ASTHMA, UNCOMPLICATED: ICD-10-CM

## 2025-02-10 PROCEDURE — 99395 PREV VISIT EST AGE 18-39: CPT | Performed by: INTERNAL MEDICINE

## 2025-02-10 PROCEDURE — 90715 TDAP VACCINE 7 YRS/> IM: CPT | Performed by: INTERNAL MEDICINE

## 2025-02-10 PROCEDURE — 90471 IMMUNIZATION ADMIN: CPT | Performed by: INTERNAL MEDICINE

## 2025-02-10 RX ORDER — FLUTICASONE PROPIONATE 50 MCG
1 SPRAY, SUSPENSION (ML) NASAL DAILY
Qty: 32 G | Refills: 5 | Status: SHIPPED | OUTPATIENT
Start: 2025-02-10

## 2025-02-10 ASSESSMENT — PATIENT HEALTH QUESTIONNAIRE - PHQ9
SUM OF ALL RESPONSES TO PHQ QUESTIONS 1-9: 0
1. LITTLE INTEREST OR PLEASURE IN DOING THINGS: NOT AT ALL
2. FEELING DOWN, DEPRESSED OR HOPELESS: NOT AT ALL
SUM OF ALL RESPONSES TO PHQ QUESTIONS 1-9: 0
SUM OF ALL RESPONSES TO PHQ9 QUESTIONS 1 & 2: 0

## 2025-02-10 NOTE — PROGRESS NOTES
Benjamin Boykin is a 34 y.o. male and presents with     Chief Complaint   Patient presents with    Annual Exam       History of Present Illness  The patient is a 34-year-old male who presents for evaluation of cough, hypertension, and involuntary eye movements.    He reports an increase in coughing frequency compared to the previous year, which he attributes to acid reflux. He also experiences intermittent nasal congestion but does not use any nasal sprays for relief. His current medication regimen includes cetirizine, albuterol inhaler as needed, Singulair, and omeprazole.    He has been on atorvastatin for several years for cholesterol management. He has not undergone any blood tests in the past year.    He has not consulted a neurologist or ophthalmologist for his involuntary eye movements, which were previously suggested to be indicative of Tourette's syndrome by his primary care physician. He reports no associated headaches or dizziness. He has observed that these eye movements tend to increase when he is fatigued.    He has not received any vaccinations this year and is uncertain about his tetanus vaccination status.    His blood pressure was low for a long time.    Supplemental Information  He had a cyst removed from the top of his head.    SOCIAL HISTORY  He is  and has children.    FAMILY HISTORY  He does not have a family history of heart disease. His father has high blood pressure, and his mother has low blood pressure.    MEDICATIONS  atorvastatin, cetirizine, albuterol inhaler, Singulair, omeprazole    IMMUNIZATIONS  He is uncertain about his tetanus vaccination status.       Past Medical History:   Diagnosis Date    Asthma     Chronic obstructive pulmonary disease (HCC)     Gilbert syndrome     Hypercholesterolemia      History reviewed. No pertinent surgical history.  Current Outpatient Medications   Medication Sig    fluticasone (FLONASE) 50 MCG/ACT nasal spray 1 spray by Each Nostril

## 2025-02-11 RX ORDER — MONTELUKAST SODIUM 10 MG/1
10 TABLET ORAL DAILY
Qty: 90 TABLET | Refills: 3 | Status: SHIPPED | OUTPATIENT
Start: 2025-02-11

## 2025-02-11 RX ORDER — ATORVASTATIN CALCIUM 10 MG/1
10 TABLET, FILM COATED ORAL DAILY
Qty: 90 TABLET | Refills: 3 | Status: SHIPPED | OUTPATIENT
Start: 2025-02-11

## 2025-02-11 RX ORDER — FLUTICASONE PROPIONATE AND SALMETEROL 250; 50 UG/1; UG/1
POWDER RESPIRATORY (INHALATION)
Qty: 5 EACH | Refills: 5 | Status: SHIPPED | OUTPATIENT
Start: 2025-02-11

## 2025-03-14 ENCOUNTER — TELEPHONE (OUTPATIENT)
Dept: PRIMARY CARE CLINIC | Facility: CLINIC | Age: 35
End: 2025-03-14

## 2025-03-22 DIAGNOSIS — J30.1 SEASONAL ALLERGIC RHINITIS DUE TO POLLEN: ICD-10-CM

## 2025-03-22 DIAGNOSIS — J34.3 NASAL TURBINATE HYPERTROPHY: ICD-10-CM

## 2025-03-22 DIAGNOSIS — J45.30 MILD PERSISTENT ASTHMA, UNCOMPLICATED: ICD-10-CM

## 2025-03-22 DIAGNOSIS — K21.9 GASTRO-ESOPHAGEAL REFLUX DISEASE WITHOUT ESOPHAGITIS: ICD-10-CM

## 2025-03-22 DIAGNOSIS — E78.00 PURE HYPERCHOLESTEROLEMIA, UNSPECIFIED: ICD-10-CM

## 2025-03-24 RX ORDER — FLUTICASONE PROPIONATE AND SALMETEROL 250; 50 UG/1; UG/1
1 POWDER RESPIRATORY (INHALATION) 2 TIMES DAILY
Qty: 5 EACH | Refills: 5 | Status: SHIPPED | OUTPATIENT
Start: 2025-03-24 | End: 2025-03-26 | Stop reason: SDUPTHER

## 2025-03-24 RX ORDER — FLUTICASONE PROPIONATE 50 MCG
1 SPRAY, SUSPENSION (ML) NASAL DAILY
Qty: 32 G | Refills: 5 | Status: SHIPPED | OUTPATIENT
Start: 2025-03-24 | End: 2025-03-26 | Stop reason: SDUPTHER

## 2025-03-24 RX ORDER — MONTELUKAST SODIUM 10 MG/1
10 TABLET ORAL DAILY
Qty: 90 TABLET | Refills: 3 | Status: SHIPPED | OUTPATIENT
Start: 2025-03-24 | End: 2025-03-26 | Stop reason: SDUPTHER

## 2025-03-24 RX ORDER — ATORVASTATIN CALCIUM 10 MG/1
10 TABLET, FILM COATED ORAL DAILY
Qty: 90 TABLET | Refills: 3 | Status: SHIPPED | OUTPATIENT
Start: 2025-03-24 | End: 2025-03-26 | Stop reason: SDUPTHER

## 2025-03-24 RX ORDER — OMEPRAZOLE 20 MG/1
20 CAPSULE, DELAYED RELEASE ORAL DAILY
Qty: 90 CAPSULE | Refills: 3 | Status: SHIPPED | OUTPATIENT
Start: 2025-03-24 | End: 2025-03-26 | Stop reason: SDUPTHER

## 2025-03-26 DIAGNOSIS — J30.1 SEASONAL ALLERGIC RHINITIS DUE TO POLLEN: ICD-10-CM

## 2025-03-26 DIAGNOSIS — J45.30 MILD PERSISTENT ASTHMA, UNCOMPLICATED: ICD-10-CM

## 2025-03-26 DIAGNOSIS — K21.9 GASTRO-ESOPHAGEAL REFLUX DISEASE WITHOUT ESOPHAGITIS: ICD-10-CM

## 2025-03-26 DIAGNOSIS — E78.00 PURE HYPERCHOLESTEROLEMIA, UNSPECIFIED: ICD-10-CM

## 2025-03-26 DIAGNOSIS — J34.3 NASAL TURBINATE HYPERTROPHY: ICD-10-CM

## 2025-03-27 RX ORDER — FLUTICASONE PROPIONATE 50 MCG
1 SPRAY, SUSPENSION (ML) NASAL DAILY
Qty: 32 G | Refills: 5 | Status: SHIPPED | OUTPATIENT
Start: 2025-03-27

## 2025-03-27 RX ORDER — OMEPRAZOLE 20 MG/1
20 CAPSULE, DELAYED RELEASE ORAL DAILY
Qty: 90 CAPSULE | Refills: 3 | Status: SHIPPED | OUTPATIENT
Start: 2025-03-27

## 2025-03-27 RX ORDER — MONTELUKAST SODIUM 10 MG/1
10 TABLET ORAL DAILY
Qty: 90 TABLET | Refills: 3 | Status: SHIPPED | OUTPATIENT
Start: 2025-03-27

## 2025-03-27 RX ORDER — FLUTICASONE PROPIONATE AND SALMETEROL 250; 50 UG/1; UG/1
1 POWDER RESPIRATORY (INHALATION) 2 TIMES DAILY
Qty: 5 EACH | Refills: 5 | Status: SHIPPED | OUTPATIENT
Start: 2025-03-27

## 2025-03-27 RX ORDER — ATORVASTATIN CALCIUM 10 MG/1
10 TABLET, FILM COATED ORAL DAILY
Qty: 90 TABLET | Refills: 3 | Status: SHIPPED | OUTPATIENT
Start: 2025-03-27

## 2025-04-02 ENCOUNTER — TELEPHONE (OUTPATIENT)
Dept: PRIMARY CARE CLINIC | Facility: CLINIC | Age: 35
End: 2025-04-02

## 2025-04-02 DIAGNOSIS — J45.30 MILD PERSISTENT ASTHMA, UNCOMPLICATED: ICD-10-CM

## 2025-04-02 RX ORDER — FLUTICASONE PROPIONATE AND SALMETEROL 250; 50 UG/1; UG/1
1 POWDER RESPIRATORY (INHALATION) 2 TIMES DAILY
Qty: 5 EACH | Refills: 5 | Status: CANCELLED | OUTPATIENT
Start: 2025-04-02

## 2025-04-02 RX ORDER — FLUTICASONE PROPIONATE AND SALMETEROL 250; 50 UG/1; UG/1
1 POWDER RESPIRATORY (INHALATION) 2 TIMES DAILY
Qty: 5 EACH | Refills: 5 | Status: SHIPPED | OUTPATIENT
Start: 2025-04-02 | End: 2025-04-03 | Stop reason: SDUPTHER

## 2025-04-02 NOTE — TELEPHONE ENCOUNTER
PCP: Chris Cordero MD    Last Visit 2/10/2025   Future Appointments   Date Time Provider Department Center   2/16/2026  2:00 PM Chris Cordero MD [facility] DEP       Requested Prescriptions      No prescriptions requested or ordered in this encounter         Other Comments: Last Refill

## 2025-04-02 NOTE — TELEPHONE ENCOUNTER
Returned call to the pharmacy   Was advised that per the ins the Fluticasone-salmeterol inhaler needs to be written AW1

## 2025-04-03 DIAGNOSIS — J45.30 MILD PERSISTENT ASTHMA, UNCOMPLICATED: ICD-10-CM

## 2025-04-03 RX ORDER — FLUTICASONE PROPIONATE AND SALMETEROL 250; 50 UG/1; UG/1
1 POWDER RESPIRATORY (INHALATION) 2 TIMES DAILY
Qty: 5 EACH | Refills: 5 | Status: SHIPPED | OUTPATIENT
Start: 2025-04-03

## 2025-05-05 DIAGNOSIS — E78.00 PURE HYPERCHOLESTEROLEMIA, UNSPECIFIED: ICD-10-CM

## 2025-05-05 DIAGNOSIS — K21.9 GASTRO-ESOPHAGEAL REFLUX DISEASE WITHOUT ESOPHAGITIS: ICD-10-CM

## 2025-05-05 DIAGNOSIS — J30.1 SEASONAL ALLERGIC RHINITIS DUE TO POLLEN: ICD-10-CM

## 2025-05-05 DIAGNOSIS — J34.3 NASAL TURBINATE HYPERTROPHY: ICD-10-CM

## 2025-05-06 RX ORDER — FLUTICASONE PROPIONATE 50 MCG
1 SPRAY, SUSPENSION (ML) NASAL DAILY
Qty: 32 G | Refills: 5 | Status: SHIPPED | OUTPATIENT
Start: 2025-05-06

## 2025-05-06 RX ORDER — OMEPRAZOLE 20 MG/1
20 CAPSULE, DELAYED RELEASE ORAL DAILY
Qty: 90 CAPSULE | Refills: 3 | Status: SHIPPED | OUTPATIENT
Start: 2025-05-06

## 2025-05-06 RX ORDER — MONTELUKAST SODIUM 10 MG/1
10 TABLET ORAL DAILY
Qty: 90 TABLET | Refills: 3 | Status: SHIPPED | OUTPATIENT
Start: 2025-05-06

## 2025-05-06 RX ORDER — ATORVASTATIN CALCIUM 10 MG/1
10 TABLET, FILM COATED ORAL DAILY
Qty: 90 TABLET | Refills: 3 | Status: SHIPPED | OUTPATIENT
Start: 2025-05-06